# Patient Record
Sex: FEMALE | Race: WHITE | Employment: FULL TIME | URBAN - METROPOLITAN AREA
[De-identification: names, ages, dates, MRNs, and addresses within clinical notes are randomized per-mention and may not be internally consistent; named-entity substitution may affect disease eponyms.]

---

## 2017-05-08 ENCOUNTER — ALLSCRIPTS OFFICE VISIT (OUTPATIENT)
Dept: OTHER | Facility: OTHER | Age: 52
End: 2017-05-08

## 2017-05-08 DIAGNOSIS — R31.29 OTHER MICROSCOPIC HEMATURIA: ICD-10-CM

## 2017-05-08 LAB
BILIRUB UR QL STRIP: NEGATIVE
CLARITY UR: NORMAL
COLOR UR: YELLOW
GLUCOSE (HISTORICAL): NEGATIVE
HGB UR QL STRIP.AUTO: NORMAL
KETONES UR STRIP-MCNC: NEGATIVE MG/DL
LEUKOCYTE ESTERASE UR QL STRIP: NORMAL
NITRITE UR QL STRIP: NEGATIVE
PH UR STRIP.AUTO: 7.5 [PH]
PROT UR STRIP-MCNC: NEGATIVE MG/DL
SP GR UR STRIP.AUTO: 1
UROBILINOGEN UR QL STRIP.AUTO: 0.2

## 2017-05-09 ENCOUNTER — GENERIC CONVERSION - ENCOUNTER (OUTPATIENT)
Dept: OTHER | Facility: OTHER | Age: 52
End: 2017-05-09

## 2017-05-09 ENCOUNTER — LAB CONVERSION - ENCOUNTER (OUTPATIENT)
Dept: OTHER | Facility: OTHER | Age: 52
End: 2017-05-09

## 2017-05-09 LAB — COLOR UR: NORMAL

## 2017-05-10 ENCOUNTER — GENERIC CONVERSION - ENCOUNTER (OUTPATIENT)
Dept: OTHER | Facility: OTHER | Age: 52
End: 2017-05-10

## 2017-05-10 ENCOUNTER — LAB CONVERSION - ENCOUNTER (OUTPATIENT)
Dept: OTHER | Facility: OTHER | Age: 52
End: 2017-05-10

## 2017-06-14 ENCOUNTER — LAB CONVERSION - ENCOUNTER (OUTPATIENT)
Dept: OTHER | Facility: OTHER | Age: 52
End: 2017-06-14

## 2017-06-14 LAB
25(OH)D3 SERPL-MCNC: 27 NG/ML (ref 30–100)
A/G RATIO (HISTORICAL): 2 (CALC) (ref 1–2.5)
ALBUMIN SERPL BCP-MCNC: 4.3 G/DL (ref 3.6–5.1)
ALP SERPL-CCNC: 95 U/L (ref 33–130)
ALT SERPL W P-5'-P-CCNC: 14 U/L (ref 6–29)
AST SERPL W P-5'-P-CCNC: 18 U/L (ref 10–35)
BASOPHILS # BLD AUTO: 0.5 %
BASOPHILS # BLD AUTO: 35 CELLS/UL (ref 0–200)
BILIRUB SERPL-MCNC: 0.6 MG/DL (ref 0.2–1.2)
BUN SERPL-MCNC: 17 MG/DL (ref 7–25)
BUN/CREA RATIO (HISTORICAL): NORMAL (CALC) (ref 6–22)
CALCIUM SERPL-MCNC: 9.4 MG/DL (ref 8.6–10.4)
CHLORIDE SERPL-SCNC: 107 MMOL/L (ref 98–110)
CHOLEST SERPL-MCNC: 195 MG/DL (ref 125–200)
CHOLEST/HDLC SERPL: 3.6 (CALC)
CO2 SERPL-SCNC: 31 MMOL/L (ref 20–31)
CREAT SERPL-MCNC: 0.65 MG/DL (ref 0.5–1.05)
DEPRECATED RDW RBC AUTO: 13.5 % (ref 11–15)
EGFR AFRICAN AMERICAN (HISTORICAL): 119 ML/MIN/1.73M2
EGFR-AMERICAN CALC (HISTORICAL): 103 ML/MIN/1.73M2
EOSINOPHIL # BLD AUTO: 161 CELLS/UL (ref 15–500)
EOSINOPHIL # BLD AUTO: 2.3 %
GAMMA GLOBULIN (HISTORICAL): 2.2 G/DL (CALC) (ref 1.9–3.7)
GLUCOSE (HISTORICAL): 94 MG/DL (ref 65–99)
HCT VFR BLD AUTO: 45.8 % (ref 35–45)
HDLC SERPL-MCNC: 54 MG/DL
HGB BLD-MCNC: 15.2 G/DL (ref 11.7–15.5)
LDL CHOLESTEROL (HISTORICAL): 123 MG/DL (CALC)
LYME IGG/IGM AB (HISTORICAL): <0.9 INDEX
LYMPHOCYTES # BLD AUTO: 1715 CELLS/UL (ref 850–3900)
LYMPHOCYTES # BLD AUTO: 24.5 %
MCH RBC QN AUTO: 30.7 PG (ref 27–33)
MCHC RBC AUTO-ENTMCNC: 33.2 G/DL (ref 32–36)
MCV RBC AUTO: 92.5 FL (ref 80–100)
MONOCYTES # BLD AUTO: 490 CELLS/UL (ref 200–950)
MONOCYTES (HISTORICAL): 7 %
NEUTROPHILS # BLD AUTO: 4599 CELLS/UL (ref 1500–7800)
NEUTROPHILS # BLD AUTO: 65.7 %
NON-HDL-CHOL (CHOL-HDL) (HISTORICAL): 141 MG/DL (CALC)
PLATELET # BLD AUTO: 225 THOUSAND/UL (ref 140–400)
PMV BLD AUTO: 7.9 FL (ref 7.5–12.5)
POTASSIUM SERPL-SCNC: 4.3 MMOL/L (ref 3.5–5.3)
RBC # BLD AUTO: 4.96 MILLION/UL (ref 3.8–5.1)
SODIUM SERPL-SCNC: 143 MMOL/L (ref 135–146)
TOTAL PROTEIN (HISTORICAL): 6.5 G/DL (ref 6.1–8.1)
TRIGL SERPL-MCNC: 89 MG/DL
TSH SERPL DL<=0.05 MIU/L-ACNC: 2.19 MIU/L
WBC # BLD AUTO: 7 THOUSAND/UL (ref 3.8–10.8)

## 2017-06-15 ENCOUNTER — GENERIC CONVERSION - ENCOUNTER (OUTPATIENT)
Dept: OTHER | Facility: OTHER | Age: 52
End: 2017-06-15

## 2017-08-09 ENCOUNTER — ALLSCRIPTS OFFICE VISIT (OUTPATIENT)
Dept: OTHER | Facility: OTHER | Age: 52
End: 2017-08-09

## 2017-08-09 LAB
BILIRUB UR QL STRIP: NEGATIVE
CLARITY UR: NORMAL
COLOR UR: NORMAL
GLUCOSE (HISTORICAL): NEGATIVE
HGB UR QL STRIP.AUTO: NORMAL
KETONES UR STRIP-MCNC: NEGATIVE MG/DL
LEUKOCYTE ESTERASE UR QL STRIP: NORMAL
NITRITE UR QL STRIP: NEGATIVE
PH UR STRIP.AUTO: 5 [PH]
PROT UR STRIP-MCNC: NORMAL MG/DL
SP GR UR STRIP.AUTO: 1.01
UROBILINOGEN UR QL STRIP.AUTO: 0.2

## 2017-08-11 ENCOUNTER — LAB CONVERSION - ENCOUNTER (OUTPATIENT)
Dept: OTHER | Facility: OTHER | Age: 52
End: 2017-08-11

## 2017-08-11 ENCOUNTER — GENERIC CONVERSION - ENCOUNTER (OUTPATIENT)
Dept: OTHER | Facility: OTHER | Age: 52
End: 2017-08-11

## 2017-08-11 LAB
BACTERIA UR QL AUTO: ABNORMAL /HPF
BILIRUB UR QL STRIP: NEGATIVE
COLOR UR: ABNORMAL
COMMENT (HISTORICAL): ABNORMAL
FECAL OCCULT BLOOD DIAGNOSTIC (HISTORICAL): ABNORMAL
GLUCOSE (HISTORICAL): NEGATIVE
HYALINE CASTS #/AREA URNS LPF: ABNORMAL /LPF
KETONES UR STRIP-MCNC: NEGATIVE MG/DL
LEUKOCYTE ESTERASE UR QL STRIP: ABNORMAL
NITRITE UR QL STRIP: NEGATIVE
PH UR STRIP.AUTO: 5.5 [PH] (ref 5–8)
PROT UR STRIP-MCNC: ABNORMAL MG/DL
RBC (HISTORICAL): ABNORMAL /HPF
RENAL EPITHELIAL CELLS (HISTORICAL): ABNORMAL /HPF
SP GR UR STRIP.AUTO: 1.02 (ref 1–1.03)
SQUAMOUS EPITHELIAL CELLS (HISTORICAL): ABNORMAL /HPF
WBC # BLD AUTO: ABNORMAL /HPF

## 2017-08-21 ENCOUNTER — GENERIC CONVERSION - ENCOUNTER (OUTPATIENT)
Dept: OTHER | Facility: OTHER | Age: 52
End: 2017-08-21

## 2017-08-21 ENCOUNTER — ALLSCRIPTS OFFICE VISIT (OUTPATIENT)
Dept: OTHER | Facility: OTHER | Age: 52
End: 2017-08-21

## 2017-08-21 LAB
BILIRUB UR QL STRIP: NEGATIVE
CLARITY UR: NORMAL
COLOR UR: YELLOW
GLUCOSE (HISTORICAL): NEGATIVE
HGB UR QL STRIP.AUTO: NEGATIVE
KETONES UR STRIP-MCNC: NEGATIVE MG/DL
LEUKOCYTE ESTERASE UR QL STRIP: NEGATIVE
NITRITE UR QL STRIP: NEGATIVE
PH UR STRIP.AUTO: 6 [PH]
PROT UR STRIP-MCNC: NEGATIVE MG/DL
SP GR UR STRIP.AUTO: 1.01
UROBILINOGEN UR QL STRIP.AUTO: 0.2

## 2017-10-17 ENCOUNTER — ALLSCRIPTS OFFICE VISIT (OUTPATIENT)
Dept: OTHER | Facility: OTHER | Age: 52
End: 2017-10-17

## 2017-10-17 DIAGNOSIS — R39.9 UNSPECIFIED SYMPTOMS AND SIGNS INVOLVING THE GENITOURINARY SYSTEM: ICD-10-CM

## 2017-10-17 LAB
BILIRUB UR QL STRIP: NEGATIVE
CLARITY UR: NORMAL
COLOR UR: YELLOW
GLUCOSE (HISTORICAL): NEGATIVE
HGB UR QL STRIP.AUTO: NORMAL
KETONES UR STRIP-MCNC: NEGATIVE MG/DL
LEUKOCYTE ESTERASE UR QL STRIP: NORMAL
NITRITE UR QL STRIP: NEGATIVE
PH UR STRIP.AUTO: 9 [PH]
PROT UR STRIP-MCNC: NEGATIVE MG/DL
SP GR UR STRIP.AUTO: 1
UROBILINOGEN UR QL STRIP.AUTO: 0.2

## 2017-10-20 ENCOUNTER — LAB CONVERSION - ENCOUNTER (OUTPATIENT)
Dept: OTHER | Facility: OTHER | Age: 52
End: 2017-10-20

## 2017-10-20 ENCOUNTER — GENERIC CONVERSION - ENCOUNTER (OUTPATIENT)
Dept: OTHER | Facility: OTHER | Age: 52
End: 2017-10-20

## 2017-10-20 LAB
BACTERIA UR QL AUTO: ABNORMAL /HPF
BILIRUB UR QL STRIP: NEGATIVE
COLOR UR: YELLOW
COMMENT (HISTORICAL): CLEAR
FECAL OCCULT BLOOD DIAGNOSTIC (HISTORICAL): ABNORMAL
GLUCOSE (HISTORICAL): NEGATIVE
HYALINE CASTS #/AREA URNS LPF: ABNORMAL /LPF
KETONES UR STRIP-MCNC: NEGATIVE MG/DL
LEUKOCYTE ESTERASE UR QL STRIP: ABNORMAL
NITRITE UR QL STRIP: NEGATIVE
PH UR STRIP.AUTO: 8 [PH] (ref 5–8)
PROT UR STRIP-MCNC: NEGATIVE MG/DL
RBC (HISTORICAL): ABNORMAL /HPF
SP GR UR STRIP.AUTO: 1.01 (ref 1–1.03)
SQUAMOUS EPITHELIAL CELLS (HISTORICAL): ABNORMAL /HPF
WBC # BLD AUTO: ABNORMAL /HPF

## 2017-10-21 NOTE — PROGRESS NOTES
Assessment  1  UTI symptoms (788 99) (R39 9)   2  Microscopic hematuria (599 72) (R31 29)   3  Leukocytes in urine (791 7) (R82 99)   4  Hypovitaminosis D (268 9) (E55 9)    Plan   UTI symptoms    · Nitrofurantoin Monohyd Macro 100 MG Oral Capsule; TAKE 1 CAPSULE TWICE  DAILY UNTIL GONE   · (1) HEMOGLOBIN A1C; Status:Active; Requested for:17Oct2017;    · (1) URINE CULTURE; Source:Urine, Clean Catch; Status:Active; Requested  for:17Oct2017;    · Urine Dip Non-Automated- POC; Status:Complete;   Done: 00OTV6311 12:43PM    (1) URINALYSIS (will reflex a microscopy if leukocytes, occult blood, protein or nitrites are not within normal limits); Status:Resulted - Requires Verification;   Done: 49XDO0749 12:00AM  Due:17Oct2018; Ordered; For:UTI symptoms; Ordered By:Shae Beckham; Discussion/Summary    29-year-old female presents with UTI symptoms with POC urine dip positive for leukocytes and microscopic blood  Will start empiric treatment with nitrofurantoin  Will send urine for urinalysis and reflex culture  Will call patient with results  Have encouraged patient to increase water intake, may benefit from starting a daily cranberry capsule  Hypovitaminosis D, patient takes 4000 International Units daily  notify patient of urine culture results  parameters discussed  The patient was counseled regarding diagnostic results,-- instructions for management,-- risk factor reductions,-- prognosis,-- patient and family education,-- impressions,-- risks and benefits of treatment options,-- importance of compliance with treatment  Possible side effects of new medications were reviewed with the patient/guardian today  The treatment plan was reviewed with the patient/guardian  The patient/guardian understands and agrees with the treatment plan      Chief Complaint  Patient is here c/o pain with urination and noticeable blood in urine x's 4+ days  All medications were reviewed and updated with the patient        History of Present Illness  HPI: 49-year-old female presents with a 4-5 day h/o urinary frequency, urgency, burning on voiding,has noticed blood fever, chills at presentvit d, helping with energy levela total of 4000 iu daily      Review of Systems    Constitutional: no fever-- and-- no chills  Gastrointestinal: no abdominal pain-- and-- no constipation  Genitourinary: dysuria  Active Problems  1  Current tobacco use (305 1) (Z72 0)   2  Decreased hearing (389 9) (H91 90)   3  Encounter for screening mammogram for malignant neoplasm of breast (V76 12)   (Z12 31)   4  Fatigue (780 79) (R53 83)   5  Hypovitaminosis D (268 9) (E55 9)   6  Leukocytes in urine (791 7) (R82 99)   7  Microscopic hematuria (599 72) (R31 29)   8  Risk of exposure to Lyme disease (V15 89) (Z91 89)   9  Screening for colorectal cancer (V76 51) (Z12 11,Z12 12)   10  Screening for lipoid disorders (V77 91) (Z13 220)   11  Screening for other and unspecified cardiovascular conditions (V81 2) (Z13 6)   12  Tobacco abuse counseling (V65 42,305 1) (Z71 6)   13  UTI symptoms (788 99) (R39 9)   14  Vaginal Pap smear (V76 47) (Z12 72)   15  Visit for screening mammogram (V76 12) (Z12 31)    Past Medical History  1  History of hearing loss (V12 49) (Z86 69)   2  History of Screening for lipoid disorders (V77 91) (Z13 220)    Family History  Mother    1  Family history of diabetes mellitus (V18 0) (Z83 3)   2  Family history of diabetes mellitus (V18 0) (Z83 3)  Father    3  Family history of    4  Family history of cerebral infarction (V17 1) (Z82 3)  Maternal Aunt    5  Family history of malignant neoplasm of breast (V16 3) (Z80 3)    Social History   · Current every day smoker (305 1) (F17 200)   · 1 ppd       smoked for 15 yrs, quit for 15 yrs and now has smoked for 3 yrs  · Current tobacco use (305 1) (Z72 0)    Surgical History  1  History of Tonsillectomy With Adenoidectomy    Current Meds   1  Multivitamins Oral Capsule;    Therapy: 64DVE6658 to Recorded   2  Nitrofurantoin Monohyd Macro 100 MG Oral Capsule; TAKE 1 CAPSULE TWICE DAILY   UNTIL GONE;   Therapy: 28ZAH2993 to (Evaluate:96Ebp4691)  Requested for: 96Mvk1263; Last   Rx:05Urq4553 Ordered    The medication list was reviewed and updated today  Allergies  1  No Known Drug Allergies    Vitals   Recorded: 91LAE4568 12:41PM   Temperature 95 F   Heart Rate 64   Respiration 16   Systolic 933   Diastolic 62   Height 5 ft 9 5 in   Weight 207 lb 8 oz   BMI Calculated 30 2   BSA Calculated 2 11     Physical Exam    Constitutional   General appearance: No acute distress, well appearing and well nourished  Ears, Nose, Mouth, and Throat   Oropharynx: Normal with no erythema, edema, exudate or lesions  Pulmonary   Respiratory effort: No increased work of breathing or signs of respiratory distress  Auscultation of lungs: Clear to auscultation  Cardiovascular   Auscultation of heart: Normal rate and rhythm, normal S1 and S2, without murmurs  Abdomen   Abdomen: Non-tender, no masses  -- no cva tenderness noted  Liver and spleen: No hepatomegaly or splenomegaly           Results/Data  Urine Dip Non-Automated- POC [id number] 12:43PM Yvonne Russell     Test Name Result Flag Reference   Color Yellow     Clarity Hazy     Leukocytes 500+++     Nitrite negative     Blood +++     Bilirubin negative     Urobilinogen 0 2     Protein negative     Ph 9 0     Specific Gravity 1 000     Ketone negative     Glucose negative         Signatures   Electronically signed by : Eva Simmons MD; Oct 20 2017 12:31PM EST                       (Author)

## 2017-11-03 ENCOUNTER — ALLSCRIPTS OFFICE VISIT (OUTPATIENT)
Dept: OTHER | Facility: OTHER | Age: 52
End: 2017-11-03

## 2017-11-03 ENCOUNTER — GENERIC CONVERSION - ENCOUNTER (OUTPATIENT)
Dept: OTHER | Facility: OTHER | Age: 52
End: 2017-11-03

## 2017-11-03 LAB
BILIRUB UR QL STRIP: NEGATIVE
CLARITY UR: NORMAL
COLOR UR: YELLOW
GLUCOSE (HISTORICAL): NEGATIVE
HGB UR QL STRIP.AUTO: NEGATIVE
KETONES UR STRIP-MCNC: NEGATIVE MG/DL
LEUKOCYTE ESTERASE UR QL STRIP: NORMAL
NITRITE UR QL STRIP: NEGATIVE
PH UR STRIP.AUTO: 5 [PH]
PROT UR STRIP-MCNC: NORMAL MG/DL
SP GR UR STRIP.AUTO: 1.01
UROBILINOGEN UR QL STRIP.AUTO: 0.2

## 2017-11-06 ENCOUNTER — GENERIC CONVERSION - ENCOUNTER (OUTPATIENT)
Dept: OTHER | Facility: OTHER | Age: 52
End: 2017-11-06

## 2017-11-06 ENCOUNTER — LAB CONVERSION - ENCOUNTER (OUTPATIENT)
Dept: OTHER | Facility: OTHER | Age: 52
End: 2017-11-06

## 2017-11-06 LAB — COLOR UR: NORMAL

## 2018-01-10 NOTE — RESULT NOTES
Verified Results  Urine Dip Non-Automated- POC 79IEC6743 12:43PM Shae Beckham     Test Name Result Flag Reference   Color Yellow     Clarity Hazy     Leukocytes 500+++     Nitrite negative     Blood +++     Bilirubin negative     Urobilinogen 0 2     Protein negative     Ph 9 0     Specific Gravity 1 000     Ketone negative     Glucose negative         Plan  UTI symptoms    · Nitrofurantoin Monohyd Macro 100 MG Oral Capsule; TAKE 1 CAPSULE TWICE  DAILY UNTIL GONE   · (1) HEMOGLOBIN A1C; Status:Active; Requested for:17Oct2017;    · (1) URINALYSIS (will reflex a microscopy if leukocytes, occult blood, protein or nitrites  are not within normal limits); Status:Active; Requested for:17Oct2017;    · (1) URINE CULTURE; Source:Urine, Clean Catch; Status:Active;  Requested  for:17Oct2017;    · Urine Dip Non-Automated- POC; Status:Complete;   Done: 75BHW2308 12:43PM

## 2018-01-10 NOTE — RESULT NOTES
Verified Results  Urine Dip Non-Automated- POC 27Poq7605 02:57PM Shae Taylor     Test Name Result Flag Reference   Color Yellow     Clarity Transparent     Leukocytes negative     Nitrite negative     Blood negative     Bilirubin negative     Urobilinogen 0 2     Protein negative     Ph 6 0     Specific Gravity 1 010     Ketone negative     Glucose negative     Color Yellow     Clarity Transparent     Leukocytes negative     Nitrite negative     Blood negative     Bilirubin negative     Urobilinogen 0 2     Protein negative     Ph 6 0     Specific Gravity 1 010     Ketone negative     Glucose negative

## 2018-01-10 NOTE — RESULT NOTES
Verified Results  (Q) URINALYSIS REFLEX [id number] 12:00AM Shae Beckham     Test Name Result Flag Reference   COLOR DARK YELLOW  YELLOW   APPEARANCE CLOUDY A CLEAR   SPECIFIC GRAVITY 1 018  1 001-1 035   PH 5 5  5 0-8 0   GLUCOSE NEGATIVE  NEGATIVE   BILIRUBIN NEGATIVE  NEGATIVE   KETONES NEGATIVE  NEGATIVE   OCCULT BLOOD 3+ A NEGATIVE   PROTEIN 1+ A NEGATIVE   NITRITE NEGATIVE  NEGATIVE   LEUKOCYTE ESTERASE 3+ A NEGATIVE   WBC 40-60 /HPF A < OR = 5   RBC 20-40 /HPF A < OR = 2   SQUAMOUS EPITHELIAL CELLS 0-5 /HPF  < OR = 5   RENAL EPITHELIAL CELLS 4-5 /HPF A < OR = 3   BACTERIA NONE SEEN /HPF  NONE SEEN   HYALINE CAST NONE SEEN /LPF  NONE SEEN

## 2018-01-11 NOTE — RESULT NOTES
Verified Results  (1) URINE CULTURE [id number] 12:00AM Shae Beckham     Test Name Result Flag Reference   CULTURE, URINE, ROUTINE  A    CULTURE, URINE, ROUTINE         MICRO NUMBER:      [id number]    TEST STATUS:       FINAL    SPECIMEN SOURCE:   URINE    SPECIMEN QUALITY:  ADEQUATE    RESULT:            10,000-50,000 CFU/mL of Escherichia coli                                               We received a preserved urine culture transport                       tube and performed a urine culture  If this is                        not what you intended to order, please contact                       your local                        immediately so that we can adjust our billing                       appropriately  You may also inquire about                       alternative or additional testing                              E coli                            ----------------                            INT   PAULINE     AMOX/CLAVULANATE       S     8 0     AMPICILLIN             R     >=32 0     AMP/SULBACTAM          R     >=32 0     CEFAZOLIN              NR    <=4 0 **1     CEFEPIME               S     <=1 0     CEFTRIAXONE            S     <=1 0     CIPROFLOXACIN          S     <=0 25     ERTAPENEM              S     <=0 5     GENTAMICIN             S     <=1 0     IMIPENEM               S     <=0 25     LEVOFLOXACIN           S     <=0 12     NITROFURANTOIN         S     <=16 0     PIP/TAZOBACTAM         S     <=4 0     TOBRAMYCIN             S     <=1 0     TRIMETHOPRIM/SULFA     S     <=20 0  S=Susceptible  I=Intermediate  R=Resistant  * = Not Tested  NR = Not Reported  **NN = See Therapy Comments  THERAPY COMMENTS      Note 1:      ORAL therapy: A cefazolin PAULINE of < 32 predicts      susceptibility to the oral agents cefaclor,      cefdinir, cefpodoxime, cefprozil, cefuroxime,      cephalexin, and loracarbef when used for therapy      of uncomplicated UTIs due to E  coli,      K  pneumoniae, and P  mirabilis  PARENTERAL therapy: A cefazolin PAULINE of > 8      indicates resistance to parenteral cefazolin  An alternate test method must be performed to      to confirm susceptibility to parenteral cefazolin

## 2018-01-12 NOTE — PROGRESS NOTES
Chief Complaint  Patient is here for a urine dip recheck  Active Problems   1  Current tobacco use (305 1) (Z72 0)  2  Decreased hearing (389 9) (H91 90)  3  Encounter for screening mammogram for malignant neoplasm of breast (V76 12)   (Z12 31)  4  Fatigue (780 79) (R53 83)  5  Hypovitaminosis D (268 9) (E55 9)  6  Leukocytes in urine (791 7) (R82 99)  7  Microscopic hematuria (599 72) (R31 29)  8  Risk of exposure to Lyme disease (V15 89) (Z91 89)  9  Screening for colorectal cancer (V76 51) (Z12 11,Z12 12)  10  Screening for lipoid disorders (V77 91) (Z13 220)  11  Screening for other and unspecified cardiovascular conditions (V81 2) (Z13 6)  12  Tobacco abuse counseling (V65 42,305 1) (Z71 6)  13  UTI symptoms (788 99) (R39 9)  14  Vaginal Pap smear (V76 47) (Z12 72)  15  Visit for screening mammogram (V76 12) (Z12 31)    Current Meds  1  Nitrofurantoin Monohyd Macro 100 MG Oral Capsule; TAKE 1 CAPSULE TWICE DAILY   UNTIL GONE;   Therapy: 23NTV5425 to (Evaluate:98Kmb3511)  Requested for: 21Wmq2936; Last   Rx:[id number] Ordered    Allergies   1  No Known Drug Allergies    Plan   Urine Dip Non-Automated- POC; Status:Resulted - Requires Verification,Retrospective By Protocol Authorization;   Done: 00VTG0663 12:00AM  Due:24Yvf7477; Last Updated By:Klaudia Riggins; 8/21/2017 3:02:37 PM;Ordered; For:UTI symptoms; Ordered By:Shae Beckham;       Signatures   Electronically signed by : Dexter Leigh MD; Aug 21 2017  3:03PM EST                       (Author)

## 2018-01-13 VITALS
SYSTOLIC BLOOD PRESSURE: 108 MMHG | HEART RATE: 76 BPM | BODY MASS INDEX: 28.79 KG/M2 | RESPIRATION RATE: 16 BRPM | TEMPERATURE: 95.9 F | HEIGHT: 70 IN | WEIGHT: 201.13 LBS | DIASTOLIC BLOOD PRESSURE: 68 MMHG

## 2018-01-14 VITALS
WEIGHT: 207.5 LBS | SYSTOLIC BLOOD PRESSURE: 102 MMHG | TEMPERATURE: 95 F | HEART RATE: 64 BPM | DIASTOLIC BLOOD PRESSURE: 62 MMHG | RESPIRATION RATE: 16 BRPM | HEIGHT: 70 IN | BODY MASS INDEX: 29.71 KG/M2

## 2018-01-14 VITALS
BODY MASS INDEX: 28.22 KG/M2 | RESPIRATION RATE: 16 BRPM | HEIGHT: 70 IN | DIASTOLIC BLOOD PRESSURE: 76 MMHG | HEART RATE: 72 BPM | SYSTOLIC BLOOD PRESSURE: 118 MMHG | WEIGHT: 197.13 LBS | TEMPERATURE: 96.3 F

## 2018-01-14 NOTE — RESULT NOTES
Verified Results  (1) URINALYSIS (will reflex a microscopy if leukocytes, occult blood, protein or nitrites are not within normal limits) 17Oct2017 12:00AM Shae Beckham     Test Name Result Flag Reference   SPECIFIC GRAVITY 1 009  1 001-1 035   BILIRUBIN NEGATIVE  NEGATIVE   GLUCOSE NEGATIVE  NEGATIVE   COLOR YELLOW  YELLOW   APPEARANCE CLEAR  CLEAR   PH 8 0  5 0-8 0   KETONES NEGATIVE  NEGATIVE   OCCULT BLOOD 2+ A NEGATIVE   PROTEIN NEGATIVE  NEGATIVE   NITRITE NEGATIVE  NEGATIVE   LEUKOCYTE ESTERASE 3+ A NEGATIVE   SQUAMOUS EPITHELIAL CELLS 10-20 /HPF A < OR = 5   BACTERIA NONE SEEN /HPF  NONE SEEN   HYALINE CAST NONE SEEN /LPF  NONE SEEN   WBC 6-10 /HPF A < OR = 5   RBC 3-10 /HPF A < OR = 2     (1) URINE CULTURE 17Oct2017 12:00AM Shae Beckham     Test Name Result Flag Reference   CULTURE, URINE, ROUTINE      CULTURE, URINE, ROUTINE         MICRO NUMBER:      [id number]    TEST STATUS:       FINAL    SPECIMEN SOURCE:   URINE    SPECIMEN QUALITY:  ADEQUATE    RESULT:            Multiple organisms present, each less than 10,000                       CFU/mL  These organisms, commonly found on                       external and internal genitalia, are considered                       to be colonizers  No further testing performed

## 2018-01-14 NOTE — RESULT NOTES
Verified Results  (Q) LIPID PANEL WITH REFLEX TO DIRECT LDL [id number] 07:45AM Shae Beckham     Test Name Result Flag Reference   CHOLESTEROL, TOTAL 195 mg/dL  125-200   HDL CHOLESTEROL 54 mg/dL  > OR = 46   TRIGLICERIDES 89 mg/dL  <600   LDL-CHOLESTEROL 123 mg/dL (calc)  <130   Desirable range <100 mg/dL for patients with CHD or  diabetes and <70 mg/dL for diabetic patients with  known heart disease  CHOL/HDLC RATIO 3 6 (calc)  < OR = 5 0   NON HDL CHOLESTEROL 141 mg/dL (calc)     Target for non-HDL cholesterol is 30 mg/dL higher than   LDL cholesterol target  (1) COMPREHENSIVE METABOLIC PANEL 11ORC7107 13:42RB Shae Beckham     Test Name Result Flag Reference   GLUCOSE 94 mg/dL  65-99   Fasting reference interval   UREA NITROGEN (BUN) 17 mg/dL  7-25   CREATININE 0 65 mg/dL  0 50-1 05   For patients >52years of age, the reference limit  for Creatinine is approximately 13% higher for people  identified as -American  eGFR NON-AFR   AMERICAN 103 mL/min/1 73m2  > OR = 60   eGFR AFRICAN AMERICAN 119 mL/min/1 73m2  > OR = 60   BUN/CREATININE RATIO   5-67   NOT APPLICABLE (calc)   SODIUM 143 mmol/L  135-146   POTASSIUM 4 3 mmol/L  3 5-5 3   CHLORIDE 107 mmol/L     CARBON DIOXIDE 31 mmol/L  20-31   CALCIUM 9 4 mg/dL  8 6-10 4   PROTEIN, TOTAL 6 5 g/dL  6 1-8 1   ALBUMIN 4 3 g/dL  3 6-5 1   GLOBULIN 2 2 g/dL (calc)  1 9-3 7   ALBUMIN/GLOBULIN RATIO 2 0 (calc)  1 0-2 5   BILIRUBIN, TOTAL 0 6 mg/dL  0 2-1 2   ALKALINE PHOSPHATASE 95 U/L     AST 18 U/L  10-35   ALT 14 U/L  6-29     (1) CBC/PLT/DIFF [id number] 07:45AM Shae Beckham     Test Name Result Flag Reference   WHITE BLOOD CELL COUNT 7 0 Thousand/uL  3 8-10 8   RED BLOOD CELL COUNT 4 96 Million/uL  3 80-5 10   HEMOGLOBIN 15 2 g/dL  11 7-15 5   HEMATOCRIT 45 8 % H 35 0-45 0   MCV 92 5 fL  80 0-100 0   MCH 30 7 pg  27 0-33 0   MCHC 33 2 g/dL  32 0-36 0   RDW 13 5 %  11 0-15 0   PLATELET COUNT 562 Thousand/uL  140-400   ABSOLUTE NEUTROPHILS 4599 cells/uL  0509-5176   ABSOLUTE LYMPHOCYTES 1715 cells/uL  850-3900   ABSOLUTE MONOCYTES 490 cells/uL  200-950   ABSOLUTE EOSINOPHILS 161 cells/uL     ABSOLUTE BASOPHILS 35 cells/uL  0-200   NEUTROPHILS 65 7 %     LYMPHOCYTES 24 5 %     MONOCYTES 7 0 %     EOSINOPHILS 2 3 %     BASOPHILS 0 5 %     MPV 7 9 fL  7 5-12 5     (Q) LYME DISEASE AB, TOTAL W/REFL WB (IGG, IGM) [id number] 07:45AM Shae Beckham     Test Name Result Flag Reference   LYME AB SCREEN <0 90 index     Index                Interpretation                     -----                --------------                     < 0 90               Negative                     0  90-1 09            Equivocal                     > 1 09               Positive      As recommended by the Food and Drug Administration   (FDA), all samples with positive or equivocal   results in a Borrelia burgdorferi antibody screen  will be tested using a blot method  Positive or   equivocal screening test results should not be   interpreted as truly positive until verified as such   using a supplemental assay (e g , B  burgdorferi blot)  The screening test and/or blot for B  burgdorferi   antibodies may be falsely negative in early stages  of Lyme disease, including the period when erythema   migrans is apparent  *(Q) VITAMIN D, 25-HYDROXY, LC/MS/MS [id number] 07:45AM Shae Beckham     Test Name Result Flag Reference   VITAMIN D, 25-OH, TOTAL 27 ng/mL L    Vitamin D Status         25-OH Vitamin D:     Deficiency:                    <20 ng/mL  Insufficiency:             20 - 29 ng/mL  Optimal:                 > or = 30 ng/mL     For 25-OH Vitamin D testing on patients on   D2-supplementation and patients for whom quantitation   of D2 and D3 fractions is required, the Dynamic Recreation(TM)  25-OH VIT D, (D2,D3), LC/MS/MS is recommended: order   code 65376 (patients >2yrs)       For more information on this test, go to:  [email or web address]  (This link is being provided for   informational/educational purposes only )     (Q) TSH, 3RD GENERATION W/REFLEX TO FT4 [id number] 07:45AM Shae Beckham   REPORT COMMENT:  FASTING:YES     Test Name Result Flag Reference   TSH W/REFLEX TO FT4 2 19 mIU/L     Reference Range                         > or = 20 Years  0 40-4 50                              Pregnancy Ranges            First trimester    0 26-2 66            Second trimester   0 55-2 73            Third trimester    0 43-2 91

## 2018-01-15 NOTE — RESULT NOTES
Verified Results  Urine Dip Non-Automated- POC [id number] 03:07PM Shae Beckham     Test Name Result Flag Reference   Color Yellow     Clarity Transparent     Leukocytes 15+-     Nitrite negative     Blood negative     Bilirubin negative     Urobilinogen 0 2     Protein 15+-     Ph 5 0     Specific Gravity 1 015     Ketone negative     Glucose negative     Color Yellow     Clarity Transparent     Leukocytes 15+-     Nitrite negative     Blood negative     Bilirubin negative     Urobilinogen 0 2     Protein 15+-     Ph 5 0     Specific Gravity 1 015     Ketone negative     Glucose negative

## 2018-01-15 NOTE — RESULT NOTES
Verified Results  (1) URINALYSIS w URINE C/S REFLEX (will reflex a microscopy if leukocytes, occult blood, or nitrites are not within normal limits) [id number] 12:00AM Shae Beckham     Test Name Result Flag Reference   COLOR TNP     **********************************   * Test not performed  *   * No suitable specimen received  *   **********************************     (1) URINE CULTURE [id number] 12:00AM Shae Beckham     Test Name Result Flag Reference   CULTURE, URINE, ROUTINE (Report)     CULTURE, URINE, ROUTINE       MICRO NUMBER:   [id number]   TEST STATUS:    FINAL   SPECIMEN SOURCE:  URINE   SPECIMEN QUALITY: ADEQUATE   RESULT:      No Growth                          We received a preserved urine culture transport             tube and performed a urine culture  If this is              not what you intended to order, please contact             your local              immediately so that we can adjust our billing             appropriately  You may also inquire about             alternative or additional testing

## 2018-01-16 NOTE — RESULT NOTES
Verified Results  (1) URINALYSIS w URINE C/S REFLEX (will reflex a microscopy if leukocytes, occult blood, or nitrites are not within normal limits) [id number] 12:00AM Shae Beckham     Test Name Result Flag Reference   COLOR TNP     **********************************   * Test not performed  *   * No suitable specimen received   *   **********************************

## 2018-01-16 NOTE — PROGRESS NOTES
Chief Complaint  Patient is here for a urine dip  Active Problems   1  Current tobacco use (305 1) (Z72 0)  2  Decreased hearing (389 9) (H91 90)  3  Encounter for screening mammogram for malignant neoplasm of breast (V76 12)   (Z12 31)  4  Fatigue (780 79) (R53 83)  5  Hypovitaminosis D (268 9) (E55 9)  6  Leukocytes in urine (791 7) (R82 99)  7  Microscopic hematuria (599 72) (R31 29)  8  Risk of exposure to Lyme disease (V15 89) (Z91 89)  9  Screening for colorectal cancer (V76 51) (Z12 11,Z12 12)  10  Screening for lipoid disorders (V77 91) (Z13 220)  11  Screening for other and unspecified cardiovascular conditions (V81 2) (Z13 6)  12  Tobacco abuse counseling (V65 42,305 1) (Z71 6)  13  UTI symptoms (788 99) (R39 9)  14  Vaginal Pap smear (V76 47) (Z12 72)  15  Visit for screening mammogram (V76 12) (Z12 31)    Current Meds  1  Multivitamins Oral Capsule; Therapy: [id number] to Recorded  2  Nitrofurantoin Monohyd Macro 100 MG Oral Capsule; TAKE 1 CAPSULE TWICE DAILY   UNTIL GONE;   Therapy: 53VLD6033 to (03 17 74 30 53)  Requested for: 02IVL1976; Last   Rx:[id number] Ordered    Allergies   1  No Known Drug Allergies    Plan   Urine Dip Non-Automated- POC; Status:Resulted - Requires Verification,Retrospective By Protocol Authorization;   Done: 49XFI2646 12:00AM  [place]:[id number]; Last Updated By:Nisha Riggins; 11/3/2017 3:12:27 PM;Ordered; For:UTI symptoms; Ordered By:Shae Beckham;       Signatures   Electronically signed by : Stevie Dickson MD; Nov  3 2017  3:13PM EST                       (Author)

## 2018-07-06 ENCOUNTER — OFFICE VISIT (OUTPATIENT)
Dept: FAMILY MEDICINE CLINIC | Facility: CLINIC | Age: 53
End: 2018-07-06
Payer: COMMERCIAL

## 2018-07-06 VITALS
SYSTOLIC BLOOD PRESSURE: 112 MMHG | DIASTOLIC BLOOD PRESSURE: 74 MMHG | WEIGHT: 199.4 LBS | HEART RATE: 76 BPM | RESPIRATION RATE: 14 BRPM | BODY MASS INDEX: 29.02 KG/M2 | TEMPERATURE: 95.7 F

## 2018-07-06 DIAGNOSIS — R53.83 FATIGUE, UNSPECIFIED TYPE: ICD-10-CM

## 2018-07-06 DIAGNOSIS — E55.9 HYPOVITAMINOSIS D: ICD-10-CM

## 2018-07-06 DIAGNOSIS — Z12.11 COLON CANCER SCREENING: ICD-10-CM

## 2018-07-06 DIAGNOSIS — R22.9 LUMP OF SKIN: ICD-10-CM

## 2018-07-06 DIAGNOSIS — N93.9 ABNORMAL UTERINE BLEEDING: Primary | ICD-10-CM

## 2018-07-06 DIAGNOSIS — H91.90 HEARING LOSS, UNSPECIFIED HEARING LOSS TYPE, UNSPECIFIED LATERALITY: ICD-10-CM

## 2018-07-06 DIAGNOSIS — Z13.220 SCREENING FOR LIPID DISORDERS: ICD-10-CM

## 2018-07-06 DIAGNOSIS — Z00.00 ROUTINE HEALTH MAINTENANCE: ICD-10-CM

## 2018-07-06 DIAGNOSIS — Z72.0 TOBACCO USE: ICD-10-CM

## 2018-07-06 DIAGNOSIS — J06.9 UPPER RESPIRATORY TRACT INFECTION, UNSPECIFIED TYPE: ICD-10-CM

## 2018-07-06 LAB
BILIRUB UR QL STRIP: NEGATIVE
CLARITY UR: CLEAR
COLOR UR: NORMAL
GLUCOSE UR STRIP-MCNC: NEGATIVE MG/DL
HGB UR QL STRIP.AUTO: NEGATIVE
KETONES UR STRIP-MCNC: NEGATIVE MG/DL
LEUKOCYTE ESTERASE UR QL STRIP: NEGATIVE
NITRITE UR QL STRIP: NEGATIVE
PH UR STRIP.AUTO: 6 [PH] (ref 4.5–8)
PROT UR STRIP-MCNC: NEGATIVE MG/DL
SL AMB  POCT GLUCOSE, UA: ABNORMAL
SL AMB LEUKOCYTE ESTERASE,UA: ABNORMAL
SL AMB POCT BILIRUBIN,UA: ABNORMAL
SL AMB POCT BLOOD,UA: ABNORMAL
SL AMB POCT CLARITY,UA: CLEAR
SL AMB POCT COLOR,UA: ABNORMAL
SL AMB POCT KETONES,UA: ABNORMAL
SL AMB POCT NITRITE,UA: ABNORMAL
SL AMB POCT PH,UA: 6
SL AMB POCT SPECIFIC GRAVITY,UA: 1.02
SL AMB POCT URINE HCG: NEGATIVE
SL AMB POCT URINE PROTEIN: ABNORMAL
SL AMB POCT UROBILINOGEN: 0.2
SP GR UR STRIP.AUTO: 1.02 (ref 1–1.03)
UROBILINOGEN UR QL STRIP.AUTO: 1 E.U./DL

## 2018-07-06 PROCEDURE — 81003 URINALYSIS AUTO W/O SCOPE: CPT | Performed by: FAMILY MEDICINE

## 2018-07-06 PROCEDURE — 81025 URINE PREGNANCY TEST: CPT | Performed by: FAMILY MEDICINE

## 2018-07-06 PROCEDURE — 99214 OFFICE O/P EST MOD 30 MIN: CPT | Performed by: FAMILY MEDICINE

## 2018-07-06 PROCEDURE — 81002 URINALYSIS NONAUTO W/O SCOPE: CPT | Performed by: FAMILY MEDICINE

## 2018-07-06 RX ORDER — AZITHROMYCIN 250 MG/1
TABLET, FILM COATED ORAL
Qty: 6 TABLET | Refills: 0 | Status: SHIPPED | OUTPATIENT
Start: 2018-07-06 | End: 2018-07-10

## 2018-07-06 NOTE — ASSESSMENT & PLAN NOTE
Patient states she has had hearing loss since adolescence and would like a referral to ENT  This has been provided

## 2018-07-06 NOTE — ASSESSMENT & PLAN NOTE
Patient has a small subcutaneous lesion over her right lower cheek, unclear etiology  Will obtain ultrasound to start off with  She will also mention this to her dentist as well as ENT

## 2018-07-06 NOTE — ASSESSMENT & PLAN NOTE
Patient had irregular bleeding X 3 days ~ 2 weeks ago, 2 years after her last menstrual period  I would like to obtain pelvic ultrasound and refer to gyn  Patient is agreeable with this plan

## 2018-07-06 NOTE — ASSESSMENT & PLAN NOTE
Have strongly encouraged, advised and counseled on the importance of tobacco cessation  Patient states she will try

## 2018-07-06 NOTE — ASSESSMENT & PLAN NOTE
Patient presents with symptoms that appear consistent with upper respiratory infection  Will call in Rx for azithromycin  Recommend continue with symptomatic treatment and supportive measures including adequate hydration  I have strongly encouraged on tobacco cessation  Return parameters discussed

## 2018-07-06 NOTE — PROGRESS NOTES
FAMILY PRACTICE OFFICE VISIT       NAME: Otilia Dougherty  AGE: 46 y o  SEX: female       : 1965        MRN: [id number]    DATE: 2018  TIME: 5:00 PM    Assessment and Plan     Problem List Items Addressed This Visit     Abnormal uterine bleeding - Primary       Patient had irregular bleeding X 3 days ~ 2 weeks ago, 2 years after her last menstrual period  I would like to obtain pelvic ultrasound and refer to gyn  Patient is agreeable with this plan  Relevant Orders    US pelvis complete non OB    CBC and differential    Comprehensive metabolic panel    TSH, 3rd generation with Free T4 reflex    Ambulatory referral to Gynecology    POCT urine dip (Completed)    POCT urine HCG (Completed)    UA (URINE) with reflex to Microscopic    Tobacco use       Have strongly encouraged, advised and counseled on the importance of tobacco cessation  Patient states she will try  Upper respiratory tract infection       Patient presents with symptoms that appear consistent with upper respiratory infection  Will call in Rx for azithromycin  Recommend continue with symptomatic treatment and supportive measures including adequate hydration  I have strongly encouraged on tobacco cessation  Return parameters discussed  Relevant Medications    azithromycin (ZITHROMAX) 250 mg tablet    Lump of skin       Patient has a small subcutaneous lesion over her right lower cheek, unclear etiology  Will obtain ultrasound to start off with  She will also mention this to her dentist as well as ENT  Relevant Orders    US head neck soft tissue    Hypovitaminosis D    Relevant Orders    Vitamin D 25 hydroxy    Hearing loss       Patient states she has had hearing loss since adolescence and would like a referral to ENT  This has been provided  Relevant Orders    Ambulatory Referral to Otolaryngology    Routine health maintenance      Referral for screening colonoscopy has been provided  Other Visit Diagnoses     Fatigue, unspecified type        Relevant Orders    Comprehensive metabolic panel    Lyme Antibody Profile with reflex to WB    Screening for lipid disorders        Relevant Orders    Lipid Panel with Direct LDL reflex    Colon cancer screening        Relevant Orders    Ambulatory referral to General Surgery      I have spent 25 minutes with Patient  today in which greater than 50% of this time was spent in counseling/coordination of care regarding Diagnostic results, Prognosis, Risks and benefits of tx options, Intructions for management, Patient and family education, Importance of tx compliance, Risk factor reductions and Impressions  There are no Patient Instructions on file for this visit  Chief Complaint     Chief Complaint   Patient presents with    Menstrual Problem     Patient is here c/o irregular bleeding x's 3 days  Patient is 2 years post menopausal     Cough     Patient also c/o post nasal drip and cough x's 1 wk  History of Present Illness     HPI    59-year-old female here to discuss recent period  Patient states she got her period two weeks ago approximately 2 years after her period stopped  She states she started to feel soreness of the breasts, fatigue, low back discomfort, symptoms that she typically has felt prior to getting her period  Patient states her flow was regular the 1st day, the 2nd day was have a another day was better  tob use: less than a pack in a 24 period    Pt states she has had a 6 day h/o cough that is productive, PND, runny nose, nasal congestion, fatigue   took alleve  Has been exposed to sick contacts    Pt also states she noticed a small nontender lump over her rigtht lower cheek region  Does not know how long she has had this for  Review of Systems   Review of Systems   Constitutional: Negative for chills and fever  HENT: Positive for congestion, postnasal drip and rhinorrhea      Respiratory: Positive for cough  Negative for shortness of breath and wheezing  Gastrointestinal: Negative for abdominal pain and constipation  Genitourinary: Positive for menstrual problem  Negative for dysuria  Active Problem List     Patient Active Problem List   Diagnosis    Abnormal uterine bleeding    Tobacco use    Upper respiratory tract infection    Lump of skin    Hypovitaminosis D    Hearing loss    Routine health maintenance       Past Medical History:  Past Medical History:   Diagnosis Date    Hearing loss        Past Surgical History:  Past Surgical History:   Procedure Laterality Date    TONSILLECTOMY AND ADENOIDECTOMY         Family History:  Family History   Problem Relation Age of Onset    Diabetes Mother     Stroke Father         cerebral infarction    Breast cancer Maternal Aunt        Social History:  Social History     Social History    Marital status: /Civil Union     Spouse name: N/A    Number of children: N/A    Years of education: N/A     Occupational History    Not on file  Social History Main Topics    Smoking status: Current Every Day Smoker     Packs/day: 1 00     Years: 15 00    Smokeless tobacco: Never Used      Comment: quit for 15 yrs and now has smoked for 3 yrs  , per allscripts    Alcohol use Yes      Comment: social    Drug use: No    Sexual activity: Not on file     Other Topics Concern    Not on file     Social History Narrative    No narrative on file     I have reviewed the patient's medical history in detail; there are no changes to the history as noted in the electronic medical record  Objective     Vitals:    07/06/18 0932   BP: 112/74   Pulse: 76   Resp: 14   Temp: (!) 95 7 °F (35 4 °C)     Wt Readings from Last 3 Encounters:   07/06/18 90 4 kg (199 lb 6 4 oz)   10/17/17 94 1 kg (207 lb 8 oz)   08/09/17 91 2 kg (201 lb 2 1 oz)       Physical Exam   Constitutional: She is oriented to person, place, and time   She appears well-developed and well-nourished  HENT:   Head: Normocephalic and atraumatic  Mouth/Throat: Oropharynx is clear and moist      TMs intact and clear  nares with edema noted  Erythema and postnasal drainage noted in posterior oropharynx  Eyes: Conjunctivae and EOM are normal  Pupils are equal, round, and reactive to light  Neck: Normal range of motion  Neck supple  No thyromegaly present  Cardiovascular: Normal rate and regular rhythm  No murmur heard  Pulmonary/Chest: Effort normal and breath sounds normal    Abdominal: Soft  Bowel sounds are normal  She exhibits no distension  There is no tenderness  Musculoskeletal: Normal range of motion  Lymphadenopathy:     She has no cervical adenopathy  Neurological: She is alert and oriented to person, place, and time  Skin: No rash noted  Small, nontender subcutaneous lump over right lower cheek   Psychiatric: She has a normal mood and affect         Pertinent Laboratory/Diagnostic Studies:  Lab Results   Component Value Date    BUN 17 06/13/2017    CREATININE 0 65 06/13/2017    CALCIUM 9 4 06/13/2017     06/13/2017    K 4 3 06/13/2017    CO2 31 06/13/2017     06/13/2017     Lab Results   Component Value Date    ALT 14 06/13/2017    AST 18 06/13/2017    ALKPHOS 95 06/13/2017    BILITOT 0 6 06/13/2017       Lab Results   Component Value Date    WBC 6-10 (A) 10/17/2017    HGB 15 2 06/13/2017    HCT 45 8 (H) 06/13/2017    MCV 92 5 06/13/2017     06/13/2017       No results found for: TSH    Lab Results   Component Value Date    CHOL 195 06/13/2017     Lab Results   Component Value Date    TRIG 89 06/13/2017     Lab Results   Component Value Date    HDL 54 06/13/2017     No results found for: LDLCALC  No results found for: HGBA1C    Results for orders placed or performed in visit on 07/06/18   POCT urine dip   Result Value Ref Range    LEUKOCYTE ESTERASE,UA -      NITRITE,UA -     SL AMB POCT UROBILINOGEN 0 2     SL AMB POCT URINE PROTEIN 15+ PH,UA 6 0      BLOOD,UA -      SPECIFIC GRAVITY,UA 1 020      [facility], UA -      COLOR,UA Dark Yellow      CLARITY,UA Clear    POCT urine HCG   Result Value Ref Range     URINE HCG negative        Orders Placed This Encounter   Procedures    US pelvis complete non OB    US head neck soft tissue    CBC and differential    Comprehensive metabolic panel    Lipid Panel with Direct LDL reflex    TSH, 3rd generation with Free T4 reflex    Vitamin D 25 hydroxy    Lyme Antibody Profile with reflex to WB    UA (URINE) with reflex to Microscopic    Ambulatory referral to General Surgery    Ambulatory Referral to Otolaryngology    Ambulatory referral to Gynecology    POCT urine dip    POCT urine HCG       ALLERGIES:  No Known Allergies    Current Medications     Current Outpatient Prescriptions   Medication Sig Dispense Refill    Multiple Vitamin (MULTIVITAMINS PO) Take by mouth      azithromycin (ZITHROMAX) 250 mg tablet Take 2 tabs first day and 1 tab for days 2-5 6 tablet 0     No current facility-administered medications for this visit  Health Maintenance   There are no preventive care reminders to display for this patient    Immunization History   Administered Date(s) Administered    Tdap 09/23/2014       Cristina Alvarez MD

## 2018-07-11 LAB
25(OH)D3 SERPL-MCNC: 29 NG/ML (ref 30–100)
ALBUMIN SERPL-MCNC: 4.2 G/DL (ref 3.6–5.1)
ALBUMIN/GLOB SERPL: 1.8 (CALC) (ref 1–2.5)
ALP SERPL-CCNC: 99 U/L (ref 33–130)
ALT SERPL-CCNC: 16 U/L (ref 6–29)
AST SERPL-CCNC: 22 U/L (ref 10–35)
B BURGDOR AB SER IA-ACNC: <0.9 INDEX
BASOPHILS # BLD AUTO: 44 CELLS/UL (ref 0–200)
BASOPHILS NFR BLD AUTO: 0.6 %
BILIRUB SERPL-MCNC: 0.5 MG/DL (ref 0.2–1.2)
BUN SERPL-MCNC: 13 MG/DL (ref 7–25)
BUN/CREAT SERPL: NORMAL (CALC) (ref 6–22)
CALCIUM SERPL-MCNC: 9 MG/DL (ref 8.6–10.4)
CHLORIDE SERPL-SCNC: 105 MMOL/L (ref 98–110)
CHOLEST SERPL-MCNC: 178 MG/DL
CHOLEST/HDLC SERPL: 3.6 (CALC)
CO2 SERPL-SCNC: 28 MMOL/L (ref 20–31)
CREAT SERPL-MCNC: 0.71 MG/DL (ref 0.5–1.05)
EOSINOPHIL # BLD AUTO: 153 CELLS/UL (ref 15–500)
EOSINOPHIL NFR BLD AUTO: 2.1 %
ERYTHROCYTE [DISTWIDTH] IN BLOOD BY AUTOMATED COUNT: 12.4 % (ref 11–15)
GLOBULIN SER CALC-MCNC: 2.3 G/DL (CALC) (ref 1.9–3.7)
GLUCOSE SERPL-MCNC: 94 MG/DL (ref 65–99)
HCT VFR BLD AUTO: 45.4 % (ref 35–45)
HDLC SERPL-MCNC: 49 MG/DL
HGB BLD-MCNC: 15.3 G/DL (ref 11.7–15.5)
LDLC SERPL CALC-MCNC: 109 MG/DL (CALC)
LYMPHOCYTES # BLD AUTO: 1986 CELLS/UL (ref 850–3900)
LYMPHOCYTES NFR BLD AUTO: 27.2 %
MCH RBC QN AUTO: 30.9 PG (ref 27–33)
MCHC RBC AUTO-ENTMCNC: 33.7 G/DL (ref 32–36)
MCV RBC AUTO: 91.7 FL (ref 80–100)
MONOCYTES # BLD AUTO: 613 CELLS/UL (ref 200–950)
MONOCYTES NFR BLD AUTO: 8.4 %
NEUTROPHILS # BLD AUTO: 4504 CELLS/UL (ref 1500–7800)
NEUTROPHILS NFR BLD AUTO: 61.7 %
NONHDLC SERPL-MCNC: 129 MG/DL (CALC)
PLATELET # BLD AUTO: 258 THOUSAND/UL (ref 140–400)
PMV BLD REES-ECKER: 10.2 FL (ref 7.5–12.5)
POTASSIUM SERPL-SCNC: 4.8 MMOL/L (ref 3.5–5.3)
PROT SERPL-MCNC: 6.5 G/DL (ref 6.1–8.1)
RBC # BLD AUTO: 4.95 MILLION/UL (ref 3.8–5.1)
SL AMB EGFR AFRICAN AMERICAN: 113 ML/MIN/1.73M2
SL AMB EGFR NON AFRICAN AMERICAN: 98 ML/MIN/1.73M2
SODIUM SERPL-SCNC: 140 MMOL/L (ref 135–146)
TRIGL SERPL-MCNC: 100 MG/DL
TSH SERPL-ACNC: 1.96 MIU/L
WBC # BLD AUTO: 7.3 THOUSAND/UL (ref 3.8–10.8)

## 2018-07-13 ENCOUNTER — HOSPITAL ENCOUNTER (OUTPATIENT)
Dept: ULTRASOUND IMAGING | Facility: HOSPITAL | Age: 53
Discharge: HOME | End: 2018-07-13
Payer: COMMERCIAL

## 2018-07-13 ENCOUNTER — TELEPHONE (OUTPATIENT)
Dept: FAMILY MEDICINE CLINIC | Facility: CLINIC | Age: 53
End: 2018-07-13

## 2018-07-13 DIAGNOSIS — N93.9 ABNORMAL UTERINE BLEEDING: ICD-10-CM

## 2018-07-13 DIAGNOSIS — R22.9 LUMP OF SKIN: ICD-10-CM

## 2018-07-13 PROCEDURE — 76830 TRANSVAGINAL US NON-OB: CPT

## 2018-07-13 PROCEDURE — 76536 US EXAM OF HEAD AND NECK: CPT

## 2018-07-13 PROCEDURE — 76856 US EXAM PELVIC COMPLETE: CPT

## 2018-07-13 NOTE — TELEPHONE ENCOUNTER
----- Message from Sabina Cruz MD sent at 7/13/2018 12:21 PM EDT -----  Can you please let patient know that her total cholesterol is 178 and the ratio between the good and bad cholesterol was good  Her blood sugar, kidneys, liver, thyroid was within normal range  Her Lyme blood work was negative and her vitamin-D was decreased  I would like her to take 2000 international daily and double this in the winter months    Thank you

## 2018-07-13 NOTE — PROGRESS NOTES
Can you please let patient know that her total cholesterol is 178 and the ratio between the good and bad cholesterol was good  Her blood sugar, kidneys, liver, thyroid was within normal range  Her Lyme blood work was negative and her vitamin-D was decreased  I would like her to take 2000 international daily and double this in the winter months    Thank you

## 2018-07-19 NOTE — PROGRESS NOTES
Left a detailed message on patient's voicemail  Patient did leave a message when she return my call stating she wanted me to leave a message on her voicemail as she was working  Stated that her neck ultrasound showed a possible epidermoid inclusion cyst which is benign  I would like patient to keep an eye on the area  If it should increase in size, I would like her to let me know so I can send her to a specialist   In addition, also discussed on the voicemail message the results of her pelvic ultrasound which shows myometrial cysts, thickened endometrial lining and a left ovarian cyst   I would like patient to schedule appointment with gyn as endometrial sampling is recommended  I have also asked patient to the give me a call if she has any questions in the meantime

## 2018-07-27 ENCOUNTER — OFFICE VISIT (OUTPATIENT)
Dept: LAB | Facility: CLINIC | Age: 53
End: 2018-07-27
Payer: COMMERCIAL

## 2018-07-27 ENCOUNTER — TRANSCRIBE ORDERS (OUTPATIENT)
Dept: LAB | Facility: CLINIC | Age: 53
End: 2018-07-27

## 2018-07-27 DIAGNOSIS — H90.5 SENSORINEURAL HEARING LOSS (SNHL), UNSPECIFIED LATERALITY: Primary | ICD-10-CM

## 2018-07-27 DIAGNOSIS — H90.5 SENSORINEURAL HEARING LOSS (SNHL), UNSPECIFIED LATERALITY: ICD-10-CM

## 2018-07-27 LAB
ATRIAL RATE: 61 BPM
P AXIS: 24 DEGREES
PR INTERVAL: 154 MS
QRS AXIS: 26 DEGREES
QRSD INTERVAL: 90 MS
QT INTERVAL: 424 MS
QTC INTERVAL: 426 MS
T WAVE AXIS: 9 DEGREES
VENTRICULAR RATE: 61 BPM

## 2018-07-27 PROCEDURE — 93005 ELECTROCARDIOGRAM TRACING: CPT

## 2018-07-27 PROCEDURE — 93010 ELECTROCARDIOGRAM REPORT: CPT | Performed by: INTERNAL MEDICINE

## 2018-09-18 ENCOUNTER — OFFICE VISIT (OUTPATIENT)
Dept: OBGYN CLINIC | Facility: CLINIC | Age: 53
End: 2018-09-18
Payer: COMMERCIAL

## 2018-09-18 VITALS
WEIGHT: 201 LBS | HEIGHT: 70 IN | BODY MASS INDEX: 28.77 KG/M2 | SYSTOLIC BLOOD PRESSURE: 120 MMHG | DIASTOLIC BLOOD PRESSURE: 74 MMHG

## 2018-09-18 DIAGNOSIS — N95.0 POSTMENOPAUSAL BLEEDING: ICD-10-CM

## 2018-09-18 DIAGNOSIS — Z12.31 ENCOUNTER FOR SCREENING MAMMOGRAM FOR MALIGNANT NEOPLASM OF BREAST: ICD-10-CM

## 2018-09-18 DIAGNOSIS — Z01.419 ENCNTR FOR GYN EXAM (GENERAL) (ROUTINE) W/O ABN FINDINGS: Primary | ICD-10-CM

## 2018-09-18 PROBLEM — J06.9 UPPER RESPIRATORY TRACT INFECTION: Status: RESOLVED | Noted: 2018-07-06 | Resolved: 2018-09-18

## 2018-09-18 PROCEDURE — G0145 SCR C/V CYTO,THINLAYER,RESCR: HCPCS | Performed by: PHYSICIAN ASSISTANT

## 2018-09-18 PROCEDURE — 87624 HPV HI-RISK TYP POOLED RSLT: CPT | Performed by: PHYSICIAN ASSISTANT

## 2018-09-18 PROCEDURE — 58100 BIOPSY OF UTERUS LINING: CPT | Performed by: PHYSICIAN ASSISTANT

## 2018-09-18 PROCEDURE — 88305 TISSUE EXAM BY PATHOLOGIST: CPT | Performed by: PATHOLOGY

## 2018-09-18 PROCEDURE — 99386 PREV VISIT NEW AGE 40-64: CPT | Performed by: PHYSICIAN ASSISTANT

## 2018-09-18 NOTE — PROGRESS NOTES
Otilia Ladonna   1965    CC:  Yearly exam    S:  46 y o  female here as a NEW PATIENT for yearly exam and problem visit  She called the office for a visit for postmenopausal bleeding and was told she could have her yearly and we would address this at the same time  She is postmenopausal and had one day of vaginal bleeding reminiscent of a normal menstrual period  She has had no vaginal bleeding since  She denies vaginal discharge, itching, odor or dryness  She did see her PCP for this and they had ordered a pelvic ultrasound  This showed a 6mm endometrial stripe which is borderline thickened as well as a 5 5cm simple ovarian cyst on the left  Last Pap  9/23/14 neg/neg  Last Mammo unknown  Last Colonoscopy never      Current Outpatient Prescriptions:     Multiple Vitamin (MULTIVITAMINS PO), Take by mouth, Disp: , Rfl:   Social History     Social History    Marital status: /Civil Union     Spouse name: N/A    Number of children: N/A    Years of education: N/A     Occupational History    Not on file  Social History Main Topics    Smoking status: Current Every Day Smoker     Packs/day: 1 00     Years: 15 00    Smokeless tobacco: Never Used      Comment: quit for 15 yrs and now has smoked for 3 yrs  , per allscripts    Alcohol use Yes      Comment: social    Drug use: No    Sexual activity: Yes     Partners: Male     Other Topics Concern    Not on file     Social History Narrative    No narrative on file     Family History   Problem Relation Age of Onset    Diabetes Mother     Stroke Father         cerebral infarction    Breast cancer Maternal Aunt      Past Medical History:   Diagnosis Date    Abnormal Pap smear of cervix     Hearing loss     HPV (human papilloma virus) infection         O:  Blood pressure 120/74, height 5' 9 5" (1 765 m), weight 91 2 kg (201 lb)      Patient appears well and is not in distress  Neck is supple without masses  Breasts are symmetrical without mass, tenderness, nipple discharge, skin changes or adenopathy  Abdomen is soft and nontender without masses  External genitals are normal without lesions or rashes  Vagina is normal without discharge or bleeding  Cervix is normal without discharge or lesion  Uterus sounds to 6cm, scant tissue obtained  Uterus is normal, mobile, nontender without palpable mass  Adnexa - left is full and nontender    A:  Yearly exam  Postmenopausal bleed  P:   Pap and HPV today   Await endometrial biopsy pathology    Follow-up ultrasound in one year to document stability of    her ovarian cyst   Colonoscopy and mammogram strongly recommended   RTO one year for yearly exam or sooner as needed

## 2018-09-20 ENCOUNTER — TELEPHONE (OUTPATIENT)
Dept: OBGYN CLINIC | Facility: CLINIC | Age: 53
End: 2018-09-20

## 2018-09-20 LAB
HPV HR 12 DNA CVX QL NAA+PROBE: NEGATIVE
HPV16 DNA CVX QL NAA+PROBE: NEGATIVE
HPV18 DNA CVX QL NAA+PROBE: NEGATIVE

## 2018-09-20 NOTE — TELEPHONE ENCOUNTER
Vanesa Ask, MISSAEL  [address] [facility]             Please let Vianey Never know that her endometrial biopsy shows absolutely no abnormalities, just like we expected   She should still have her follow-up pelvic ultrasound in a year to check her ovarian cyst   She should call if she has any more bleeding

## 2018-09-20 NOTE — TELEPHONE ENCOUNTER
Left voicemail message today @ [phone] per Pt's signed communication consent form in EHR  Pt informed, via voicemail message, that her recent endometrial biopsy showed no abnormalities per Elizabeth Henderson' review  Pt advised, via vm message, that she should still have her follow-up pelvic ultrasound in a year to check her ovarian cyst per Elizabeth Henderson' recommendation  Reiterated to Pt, via vm message, that she should contact office if she has any more bleeding

## 2018-09-24 LAB
LAB AP GYN PRIMARY INTERPRETATION: NORMAL
Lab: NORMAL

## 2018-11-01 DIAGNOSIS — Z12.11 COLON CANCER SCREENING: Primary | ICD-10-CM

## 2020-04-30 ENCOUNTER — TELEMEDICINE (OUTPATIENT)
Dept: FAMILY MEDICINE CLINIC | Facility: CLINIC | Age: 55
End: 2020-04-30
Payer: COMMERCIAL

## 2020-04-30 VITALS
BODY MASS INDEX: 29.35 KG/M2 | HEIGHT: 70 IN | WEIGHT: 205 LBS | SYSTOLIC BLOOD PRESSURE: 114 MMHG | TEMPERATURE: 98.2 F | DIASTOLIC BLOOD PRESSURE: 73 MMHG | HEART RATE: 78 BPM

## 2020-04-30 DIAGNOSIS — Z20.828 EXPOSURE TO SARS-ASSOCIATED CORONAVIRUS: ICD-10-CM

## 2020-04-30 DIAGNOSIS — B34.9 VIRAL ILLNESS: Primary | ICD-10-CM

## 2020-04-30 DIAGNOSIS — E55.9 HYPOVITAMINOSIS D: ICD-10-CM

## 2020-04-30 DIAGNOSIS — R53.83 FATIGUE, UNSPECIFIED TYPE: ICD-10-CM

## 2020-04-30 DIAGNOSIS — Z13.6 ENCOUNTER FOR LIPID SCREENING FOR CARDIOVASCULAR DISEASE: ICD-10-CM

## 2020-04-30 DIAGNOSIS — Z13.220 ENCOUNTER FOR LIPID SCREENING FOR CARDIOVASCULAR DISEASE: ICD-10-CM

## 2020-04-30 PROCEDURE — U0003 INFECTIOUS AGENT DETECTION BY NUCLEIC ACID (DNA OR RNA); SEVERE ACUTE RESPIRATORY SYNDROME CORONAVIRUS 2 (SARS-COV-2) (CORONAVIRUS DISEASE [COVID-19]), AMPLIFIED PROBE TECHNIQUE, MAKING USE OF HIGH THROUGHPUT TECHNOLOGIES AS DESCRIBED BY CMS-2020-01-R: HCPCS

## 2020-04-30 PROCEDURE — 99214 OFFICE O/P EST MOD 30 MIN: CPT | Performed by: FAMILY MEDICINE

## 2020-05-01 ENCOUNTER — TELEPHONE (OUTPATIENT)
Dept: FAMILY MEDICINE CLINIC | Facility: CLINIC | Age: 55
End: 2020-05-01

## 2020-05-01 ENCOUNTER — TELEMEDICINE (OUTPATIENT)
Dept: FAMILY MEDICINE CLINIC | Facility: CLINIC | Age: 55
End: 2020-05-01
Payer: COMMERCIAL

## 2020-05-01 VITALS
HEIGHT: 70 IN | DIASTOLIC BLOOD PRESSURE: 63 MMHG | WEIGHT: 205 LBS | TEMPERATURE: 97.6 F | HEART RATE: 68 BPM | BODY MASS INDEX: 29.35 KG/M2 | SYSTOLIC BLOOD PRESSURE: 121 MMHG

## 2020-05-01 DIAGNOSIS — U07.1 COVID-19 VIRUS INFECTION: Primary | ICD-10-CM

## 2020-05-01 DIAGNOSIS — R07.89 CHEST TIGHTNESS: ICD-10-CM

## 2020-05-01 LAB — SARS-COV-2 RNA SPEC QL NAA+PROBE: DETECTED

## 2020-05-01 PROCEDURE — 99214 OFFICE O/P EST MOD 30 MIN: CPT | Performed by: FAMILY MEDICINE

## 2020-05-01 RX ORDER — ALBUTEROL SULFATE 90 UG/1
2 AEROSOL, METERED RESPIRATORY (INHALATION) EVERY 6 HOURS PRN
Qty: 18 G | Refills: 0 | Status: SHIPPED | OUTPATIENT
Start: 2020-05-01 | End: 2022-08-09 | Stop reason: ALTCHOICE

## 2020-05-02 ENCOUNTER — TELEMEDICINE (OUTPATIENT)
Dept: FAMILY MEDICINE CLINIC | Facility: CLINIC | Age: 55
End: 2020-05-02
Payer: COMMERCIAL

## 2020-05-02 VITALS — HEART RATE: 66 BPM | SYSTOLIC BLOOD PRESSURE: 133 MMHG | TEMPERATURE: 97.6 F | DIASTOLIC BLOOD PRESSURE: 60 MMHG

## 2020-05-02 DIAGNOSIS — U07.1 COVID-19 VIRUS INFECTION: Primary | ICD-10-CM

## 2020-05-02 PROCEDURE — 99214 OFFICE O/P EST MOD 30 MIN: CPT | Performed by: FAMILY MEDICINE

## 2020-05-03 ENCOUNTER — TELEMEDICINE (OUTPATIENT)
Dept: FAMILY MEDICINE CLINIC | Facility: CLINIC | Age: 55
End: 2020-05-03
Payer: COMMERCIAL

## 2020-05-03 ENCOUNTER — TELEPHONE (OUTPATIENT)
Dept: FAMILY MEDICINE CLINIC | Facility: CLINIC | Age: 55
End: 2020-05-03

## 2020-05-03 VITALS — TEMPERATURE: 97.2 F | SYSTOLIC BLOOD PRESSURE: 131 MMHG | HEART RATE: 66 BPM | DIASTOLIC BLOOD PRESSURE: 75 MMHG

## 2020-05-03 DIAGNOSIS — U07.1 COVID-19 VIRUS INFECTION: Primary | ICD-10-CM

## 2020-05-03 PROCEDURE — 99213 OFFICE O/P EST LOW 20 MIN: CPT | Performed by: FAMILY MEDICINE

## 2020-05-05 ENCOUNTER — TELEMEDICINE (OUTPATIENT)
Dept: FAMILY MEDICINE CLINIC | Facility: CLINIC | Age: 55
End: 2020-05-05
Payer: COMMERCIAL

## 2020-05-05 VITALS
BODY MASS INDEX: 29.35 KG/M2 | DIASTOLIC BLOOD PRESSURE: 72 MMHG | HEART RATE: 70 BPM | SYSTOLIC BLOOD PRESSURE: 122 MMHG | TEMPERATURE: 97.2 F | HEIGHT: 70 IN | WEIGHT: 205 LBS

## 2020-05-05 DIAGNOSIS — Z12.11 SCREENING FOR COLON CANCER: ICD-10-CM

## 2020-05-05 DIAGNOSIS — U07.1 COVID-19 VIRUS INFECTION: Primary | ICD-10-CM

## 2020-05-05 DIAGNOSIS — Z12.31 SCREENING MAMMOGRAM, ENCOUNTER FOR: ICD-10-CM

## 2020-05-05 PROCEDURE — 3008F BODY MASS INDEX DOCD: CPT | Performed by: FAMILY MEDICINE

## 2020-05-05 PROCEDURE — 99213 OFFICE O/P EST LOW 20 MIN: CPT | Performed by: FAMILY MEDICINE

## 2020-05-06 ENCOUNTER — TELEMEDICINE (OUTPATIENT)
Dept: FAMILY MEDICINE CLINIC | Facility: CLINIC | Age: 55
End: 2020-05-06
Payer: COMMERCIAL

## 2020-05-06 VITALS
TEMPERATURE: 97.7 F | HEART RATE: 73 BPM | WEIGHT: 200 LBS | DIASTOLIC BLOOD PRESSURE: 60 MMHG | BODY MASS INDEX: 28.7 KG/M2 | SYSTOLIC BLOOD PRESSURE: 119 MMHG

## 2020-05-06 DIAGNOSIS — U07.1 COVID-19 VIRUS INFECTION: Primary | ICD-10-CM

## 2020-05-06 PROCEDURE — 99213 OFFICE O/P EST LOW 20 MIN: CPT | Performed by: FAMILY MEDICINE

## 2020-05-07 ENCOUNTER — TELEMEDICINE (OUTPATIENT)
Dept: FAMILY MEDICINE CLINIC | Facility: CLINIC | Age: 55
End: 2020-05-07
Payer: COMMERCIAL

## 2020-05-07 VITALS — SYSTOLIC BLOOD PRESSURE: 122 MMHG | HEART RATE: 75 BPM | DIASTOLIC BLOOD PRESSURE: 70 MMHG | TEMPERATURE: 97.2 F

## 2020-05-07 DIAGNOSIS — U07.1 COVID-19 VIRUS INFECTION: Primary | ICD-10-CM

## 2020-05-07 PROCEDURE — 99214 OFFICE O/P EST MOD 30 MIN: CPT | Performed by: FAMILY MEDICINE

## 2020-05-08 ENCOUNTER — TELEMEDICINE (OUTPATIENT)
Dept: FAMILY MEDICINE CLINIC | Facility: CLINIC | Age: 55
End: 2020-05-08
Payer: COMMERCIAL

## 2020-05-08 VITALS — TEMPERATURE: 97.8 F | DIASTOLIC BLOOD PRESSURE: 67 MMHG | HEART RATE: 68 BPM | SYSTOLIC BLOOD PRESSURE: 124 MMHG

## 2020-05-08 DIAGNOSIS — U07.1 COVID-19 VIRUS INFECTION: Primary | ICD-10-CM

## 2020-05-08 PROCEDURE — 99213 OFFICE O/P EST LOW 20 MIN: CPT | Performed by: FAMILY MEDICINE

## 2020-05-27 ENCOUNTER — TELEPHONE (OUTPATIENT)
Dept: FAMILY MEDICINE CLINIC | Facility: CLINIC | Age: 55
End: 2020-05-27

## 2020-05-29 ENCOUNTER — TELEPHONE (OUTPATIENT)
Dept: FAMILY MEDICINE CLINIC | Facility: CLINIC | Age: 55
End: 2020-05-29

## 2020-12-21 ENCOUNTER — HOSPITAL ENCOUNTER (OUTPATIENT)
Dept: BONE DENSITY | Facility: IMAGING CENTER | Age: 55
Discharge: HOME | End: 2020-12-21
Payer: COMMERCIAL

## 2020-12-21 VITALS — HEIGHT: 70 IN | BODY MASS INDEX: 32.21 KG/M2 | WEIGHT: 225 LBS

## 2020-12-21 DIAGNOSIS — Z12.31 SCREENING MAMMOGRAM, ENCOUNTER FOR: ICD-10-CM

## 2020-12-21 PROCEDURE — 77067 SCR MAMMO BI INCL CAD: CPT

## 2020-12-21 PROCEDURE — 77063 BREAST TOMOSYNTHESIS BI: CPT

## 2022-07-21 ENCOUNTER — OFFICE VISIT (OUTPATIENT)
Dept: URGENT CARE | Facility: CLINIC | Age: 57
End: 2022-07-21
Payer: COMMERCIAL

## 2022-07-21 VITALS
SYSTOLIC BLOOD PRESSURE: 123 MMHG | HEART RATE: 69 BPM | HEIGHT: 70 IN | WEIGHT: 211 LBS | TEMPERATURE: 96.8 F | BODY MASS INDEX: 30.21 KG/M2 | DIASTOLIC BLOOD PRESSURE: 57 MMHG | OXYGEN SATURATION: 98 % | RESPIRATION RATE: 18 BRPM

## 2022-07-21 DIAGNOSIS — H66.91 RIGHT OTITIS MEDIA, UNSPECIFIED OTITIS MEDIA TYPE: Primary | ICD-10-CM

## 2022-07-21 DIAGNOSIS — H60.91 OTITIS EXTERNA OF RIGHT EAR, UNSPECIFIED CHRONICITY, UNSPECIFIED TYPE: ICD-10-CM

## 2022-07-21 DIAGNOSIS — T16.1XXA FOREIGN BODY OF RIGHT EAR, INITIAL ENCOUNTER: ICD-10-CM

## 2022-07-21 PROCEDURE — G0382 LEV 3 HOSP TYPE B ED VISIT: HCPCS | Performed by: PHYSICIAN ASSISTANT

## 2022-07-21 PROCEDURE — 69200 CLEAR OUTER EAR CANAL: CPT | Performed by: PHYSICIAN ASSISTANT

## 2022-07-21 RX ORDER — AMOXICILLIN 500 MG/1
500 CAPSULE ORAL EVERY 8 HOURS SCHEDULED
Qty: 30 CAPSULE | Refills: 0 | Status: SHIPPED | OUTPATIENT
Start: 2022-07-21 | End: 2022-07-31

## 2022-07-21 RX ORDER — NEOMYCIN SULFATE, POLYMYXIN B SULFATE AND HYDROCORTISONE 10; 3.5; 1 MG/ML; MG/ML; [USP'U]/ML
3 SUSPENSION/ DROPS AURICULAR (OTIC) EVERY 6 HOURS SCHEDULED
Qty: 10 ML | Refills: 0 | Status: SHIPPED | OUTPATIENT
Start: 2022-07-21 | End: 2022-08-09

## 2022-07-21 NOTE — PROGRESS NOTES
[id number]        NAME: Yun Brady is a 64 y o  female  : 1965    MRN: [id number]  DATE: 2022  TIME: 7:42 PM    /57   Pulse 69   Temp (!) 96 8 °F (36 °C)   Resp 18   Ht 5' 10" (1 778 m)   Wt 95 7 kg (211 lb)   SpO2 98%   BMI 30 28 kg/m²     Assessment and Plan   Right otitis media, unspecified otitis media type [H66 91]  1  Right otitis media, unspecified otitis media type  amoxicillin (AMOXIL) 500 mg capsule    neomycin-polymyxin-hydrocortisone (CORTISPORIN) 0 35%-10,000 units/mL-1% otic suspension   2  Otitis externa of right ear, unspecified chronicity, unspecified type  amoxicillin (AMOXIL) 500 mg capsule    neomycin-polymyxin-hydrocortisone (CORTISPORIN) 0 35%-10,000 units/mL-1% otic suspension   3  Foreign body of right ear, initial encounter  amoxicillin (AMOXIL) 500 mg capsule    neomycin-polymyxin-hydrocortisone (CORTISPORIN) 0 35%-10,000 units/mL-1% otic suspension         Patient Instructions       Follow up with PCP in 3-5 days  Proceed to  ER if symptoms worsen  Chief Complaint     Chief Complaint   Patient presents with    Earache     Right ear pain x 1 week         History of Present Illness       Pt with right ear pain x 1 week       Review of Systems   Review of Systems   Constitutional: Negative  HENT: Positive for ear pain  Eyes: Negative  Respiratory: Negative  Cardiovascular: Negative  Gastrointestinal: Negative  Endocrine: Negative  Genitourinary: Negative  Musculoskeletal: Negative  Skin: Negative  Allergic/Immunologic: Negative  Neurological: Negative  Hematological: Negative  Psychiatric/Behavioral: Negative  All other systems reviewed and are negative          Current Medications       Current Outpatient Medications:     amoxicillin (AMOXIL) 500 mg capsule, Take 1 capsule (500 mg total) by mouth every 8 (eight) hours for 10 days, Disp: 30 capsule, Rfl: 0    neomycin-polymyxin-hydrocortisone (CORTISPORIN) 0 35%-10,000 units/mL-1% otic suspension, Administer 3 drops to the right ear every 6 (six) hours for 10 days, Disp: 10 mL, Rfl: 0    albuterol (Ventolin HFA) 90 mcg/act inhaler, Inhale 2 puffs every 6 (six) hours as needed for wheezing (Chest tightness) (Patient not taking: Reported on 7/21/2022), Disp: 18 g, Rfl: 0    Multiple Vitamin (MULTIVITAMINS PO), Take by mouth (Patient not taking: Reported on 7/21/2022), Disp: , Rfl:     Current Allergies     Allergies as of 07/21/2022    (No Known Allergies)            The following portions of the patient's history were reviewed and updated as appropriate: allergies, current medications, past family history, past medical history, past social history, past surgical history and problem list      Past Medical History:   Diagnosis Date    Abnormal Pap smear of cervix     Hearing loss     HPV (human papilloma virus) infection        Past Surgical History:   Procedure Laterality Date    TONSILLECTOMY AND ADENOIDECTOMY         Family History   Problem Relation Age of Onset    Diabetes Mother     Breast cancer Mother 80    Stroke Father         cerebral infarction    No Known Problems Maternal Aunt     No Known Problems Sister     No Known Problems Daughter     No Known Problems Maternal Grandmother     No Known Problems Maternal Grandfather     No Known Problems Paternal Grandmother     No Known Problems Paternal Grandfather     No Known Problems Paternal Aunt     No Known Problems Paternal Aunt     No Known Problems Maternal Aunt     No Known Problems Maternal Aunt          Medications have been verified  Objective   /57   Pulse 69   Temp (!) 96 8 °F (36 °C)   Resp 18   Ht 5' 10" (1 778 m)   Wt 95 7 kg (211 lb)   SpO2 98%   BMI 30 28 kg/m²        Physical Exam     Physical Exam  Vitals and nursing note reviewed  Constitutional:       Appearance: Normal appearance  She is well-developed and normal weight     HENT: Head: Normocephalic and atraumatic  Right Ear: External ear normal       Left Ear: Tympanic membrane, ear canal and external ear normal       Ears:      Comments: Plastic tip of hearing aid in right ear canal  Easily removed with forceps, post removal tm erythema and ear canal swelling and erythema  No perforation      Nose: Nose normal       Mouth/Throat:      Mouth: Mucous membranes are moist       Pharynx: Oropharynx is clear  Eyes:      Extraocular Movements: Extraocular movements intact  Conjunctiva/sclera: Conjunctivae normal       Pupils: Pupils are equal, round, and reactive to light  Cardiovascular:      Rate and Rhythm: Normal rate and regular rhythm  Pulses: Normal pulses  Heart sounds: Normal heart sounds  Pulmonary:      Effort: Pulmonary effort is normal       Breath sounds: Normal breath sounds  Abdominal:      General: Abdomen is flat  Bowel sounds are normal       Palpations: Abdomen is soft  Musculoskeletal:         General: Normal range of motion  Cervical back: Normal range of motion and neck supple  Skin:     General: Skin is warm  Capillary Refill: Capillary refill takes less than 2 seconds  Neurological:      General: No focal deficit present  Mental Status: She is alert and oriented to person, place, and time  Psychiatric:         Mood and Affect: Mood normal          Behavior: Behavior normal          Thought Content:  Thought content normal          Judgment: Judgment normal

## 2022-08-09 ENCOUNTER — OFFICE VISIT (OUTPATIENT)
Dept: FAMILY MEDICINE CLINIC | Facility: CLINIC | Age: 57
End: 2022-08-09
Payer: COMMERCIAL

## 2022-08-09 VITALS
TEMPERATURE: 98 F | HEART RATE: 73 BPM | OXYGEN SATURATION: 98 % | DIASTOLIC BLOOD PRESSURE: 78 MMHG | RESPIRATION RATE: 16 BRPM | SYSTOLIC BLOOD PRESSURE: 124 MMHG | BODY MASS INDEX: 30.61 KG/M2 | HEIGHT: 70 IN | WEIGHT: 213.8 LBS

## 2022-08-09 DIAGNOSIS — E55.9 HYPOVITAMINOSIS D: ICD-10-CM

## 2022-08-09 DIAGNOSIS — Z12.31 BREAST CANCER SCREENING BY MAMMOGRAM: ICD-10-CM

## 2022-08-09 DIAGNOSIS — Z12.11 COLON CANCER SCREENING: ICD-10-CM

## 2022-08-09 DIAGNOSIS — E66.09 CLASS 1 OBESITY DUE TO EXCESS CALORIES WITHOUT SERIOUS COMORBIDITY WITH BODY MASS INDEX (BMI) OF 30.0 TO 30.9 IN ADULT: ICD-10-CM

## 2022-08-09 DIAGNOSIS — Z13.220 LIPID SCREENING: ICD-10-CM

## 2022-08-09 DIAGNOSIS — Z23 NEED FOR PNEUMOCOCCAL VACCINATION: ICD-10-CM

## 2022-08-09 DIAGNOSIS — Z00.00 PHYSICAL EXAM, ANNUAL: Primary | ICD-10-CM

## 2022-08-09 PROCEDURE — 90471 IMMUNIZATION ADMIN: CPT

## 2022-08-09 PROCEDURE — 99396 PREV VISIT EST AGE 40-64: CPT | Performed by: NURSE PRACTITIONER

## 2022-08-09 PROCEDURE — 90677 PCV20 VACCINE IM: CPT

## 2022-08-09 PROCEDURE — 3725F SCREEN DEPRESSION PERFORMED: CPT | Performed by: NURSE PRACTITIONER

## 2022-08-09 NOTE — PROGRESS NOTES
FAMILY PRACTICE HEALTH MAINTENANCE OFFICE VISIT  [facility]    NAME: Otilia Dougherty  AGE: 64 y o  SEX: female  : 1965     DATE: 2022    Assessment and Plan     1  Physical exam, annual  -     CBC; Future  -     Comprehensive metabolic panel; Future    2  Colon cancer screening  -     Cologuard    3  Breast cancer screening by mammogram  -     Mammo screening bilateral w 3d & cad; Future; Expected date: 2022    4  Lipid screening  -     Lipid panel; Future    5  Class 1 obesity due to excess calories without serious comorbidity with body mass index (BMI) of 30 0 to 30 9 in adult  -     CBC; Future  -     Comprehensive metabolic panel; Future  -     Lipid panel; Future    6  Hypovitaminosis D  -     Vitamin D 25 hydroxy; Future    7  Need for pneumococcal vaccination  -     Pneumococcal Conjugate Vaccine 20-valent (Pcv20)      Patient Counseling:   Nutrition: Stressed importance of a well balanced diet, moderation of sodium/saturated fat, caloric balance and sufficient intake of fiber  Exercise: Stressed the importance of regular exercise with a goal of 150 minutes per week  Dental Health: Discussed daily flossing and brushing and regular dental visits     Immunizations reviewed: Up To Date Had COVID-19 primary series with 1st booster  Discussed benefits of:  Colon Cancer Screening, Mammogram , Cervical Cancer screening and Screening labs  BMI Counseling: Body mass index is 30 68 kg/m²  Discussed with patient's BMI with her  The BMI is above normal  Nutrition recommendations include 3-5 servings of fruits/vegetables daily  Exercise recommendations include exercising 3-5 times per week  Tobacco Cessation Counseling: Tobacco cessation counseling and education was provided  The patient is sincerely urged to quit consumption of tobacco  She is not ready to quit tobacco  The numerous health risks of tobacco consumption were discussed   If she decides to quit, there are a number of helpful adjunctive aids, and she can see me to discuss nicotine replacement therapy, chantix, or bupropion anytime in the future  Follow up in one year for annual physical or sooner if needed  Reluctant to do health care screenings due to cost    Declines CT lung cancer screening program     Chief Complaint     Chief Complaint   Patient presents with    Physical Exam     Annual well exam       History of Present Illness     Ruy Gomez is a 64year old female presenting today for annual exam     CNA working night shift  [employer] [facility] [facility] [employer]  Able to sleep daytime  Is a good fit for her  2 children live locally      Smoking-- not ready to quit  Has quit off an on for years  18 started  <1PPD  CT lung cancer screening: Does not desire to complete  Hearing aids  Colonoscopy declines  Will do cologuard  GYN: [facility] she is due to schedule  Last exam 2018  Angular cheilitis: she feels is from toothpaste, uses chap stick-- helps  Prevnar 20 will do  Caltrate in the past has not tolerated  Well Adult Physical   Patient here for a comprehensive physical exam       Diet and Physical Activity  Diet: well balanced diet  Exercise: not as much as she would like too, hard to find time to exercise         Depression Screen  PHQ-2/9 Depression Screening    Little interest or pleasure in doing things: 0 - not at all  Feeling down, depressed, or hopeless: 0 - not at all  PHQ-2 Score: 0  PHQ-2 Interpretation: Negative depression screen          General Health  Hearing: Normal:  bilateral  Vision: no vision problems  Dental: regular dental visits    Reproductive Health  Post-menopausal       The following portions of the patient's history were reviewed and updated as appropriate: allergies, current medications, past family history, past medical history, past social history, past surgical history and problem list     Review of Systems Review of Systems   Constitutional: Negative  HENT: Negative  Eyes: Negative for visual disturbance  Respiratory: Negative  Cardiovascular: Negative  Gastrointestinal: Negative  Genitourinary: Negative  Musculoskeletal: Negative  Skin: Negative  Neurological: Negative  Hematological: Negative  Psychiatric/Behavioral: Negative          Past Medical History     Past Medical History:   Diagnosis Date    Abnormal Pap smear of cervix     Hearing loss     HPV (human papilloma virus) infection        Past Surgical History     Past Surgical History:   Procedure Laterality Date    TONSILLECTOMY AND ADENOIDECTOMY         Social History     Social History     Socioeconomic History    Marital status: /Civil Union     Spouse name: None    Number of children: None    Years of education: None    Highest education level: None   Occupational History    None   Tobacco Use    Smoking status: Current Every Day Smoker     Packs/day: 1 00     Years: 20 00     Pack years: 20 00     Types: Cigarettes    Smokeless tobacco: Never Used   Vaping Use    Vaping Use: Never used   Substance and Sexual Activity    Alcohol use: Yes     Comment: social    Drug use: No    Sexual activity: Yes     Partners: Male   Other Topics Concern    None   Social History Narrative    None     Social Determinants of Health     Financial Resource Strain: Not on file   Food Insecurity: Not on file   Transportation Needs: Not on file   Physical Activity: Not on file   Stress: Not on file   Social Connections: Not on file   Intimate Partner Violence: Not on file   Housing Stability: Not on file       Family History     Family History   Problem Relation Age of Onset    Diabetes Mother     Breast cancer Mother 80    Stroke Father         cerebral infarction    No Known Problems Maternal Aunt     No Known Problems Sister     No Known Problems Daughter     No Known Problems Maternal Grandmother     No Known Problems Maternal Grandfather     No Known Problems Paternal Grandmother     No Known Problems Paternal Grandfather     No Known Problems Paternal Aunt     No Known Problems Paternal Aunt     No Known Problems Maternal Aunt     No Known Problems Maternal Aunt        Current Medications     No current outpatient medications on file  Allergies     No Known Allergies    Objective     /78 (BP Location: Right arm, Patient Position: Sitting, Cuff Size: Large)   Pulse 73   Temp 98 °F (36 7 °C)   Resp 16   Ht 5' 10" (1 778 m)   Wt 97 kg (213 lb 12 8 oz)   SpO2 98%   BMI 30 68 kg/m²      Physical Exam  Vitals and nursing note reviewed  Constitutional:       General: She is not in acute distress  Appearance: Normal appearance  She is well-developed  She is not ill-appearing  HENT:      Head: Normocephalic and atraumatic  Right Ear: Tympanic membrane normal       Left Ear: Tympanic membrane normal       Mouth/Throat:      Mouth: Mucous membranes are moist       Pharynx: Oropharynx is clear  Eyes:      Conjunctiva/sclera: Conjunctivae normal       Pupils: Pupils are equal, round, and reactive to light  Neck:      Thyroid: No thyromegaly  Cardiovascular:      Rate and Rhythm: Normal rate and regular rhythm  Heart sounds: No murmur heard  Pulmonary:      Effort: Pulmonary effort is normal       Breath sounds: Normal breath sounds  Abdominal:      General: Bowel sounds are normal       Palpations: Abdomen is soft  Tenderness: There is no abdominal tenderness  Musculoskeletal:      Cervical back: Normal range of motion and neck supple  Right lower leg: No edema  Left lower leg: No edema  Lymphadenopathy:      Cervical: No cervical adenopathy  Skin:     Findings: No rash  Comments: Small mildly scaling fissures at corners of mouth, no redness  Neurological:      Mental Status: She is alert and oriented to person, place, and time     Psychiatric: Mood and Affect: Mood normal                MICHELLE Holliday  [address] [facility]

## 2022-08-19 LAB
25(OH)D3 SERPL-MCNC: 21 NG/ML (ref 30–100)
ALBUMIN SERPL-MCNC: 4.6 G/DL (ref 3.6–5.1)
ALBUMIN/GLOB SERPL: 1.6 (CALC) (ref 1–2.5)
ALP SERPL-CCNC: 104 U/L (ref 37–153)
ALT SERPL-CCNC: 20 U/L (ref 6–29)
AST SERPL-CCNC: 20 U/L (ref 10–35)
BASOPHILS # BLD AUTO: 67 CELLS/UL (ref 0–200)
BASOPHILS NFR BLD AUTO: 0.8 %
BILIRUB SERPL-MCNC: 0.5 MG/DL (ref 0.2–1.2)
BUN SERPL-MCNC: 15 MG/DL (ref 7–25)
BUN/CREAT SERPL: NORMAL (CALC) (ref 6–22)
CALCIUM SERPL-MCNC: 9.6 MG/DL (ref 8.6–10.4)
CHLORIDE SERPL-SCNC: 103 MMOL/L (ref 98–110)
CHOLEST SERPL-MCNC: 254 MG/DL
CHOLEST/HDLC SERPL: 5 (CALC)
CO2 SERPL-SCNC: 29 MMOL/L (ref 20–32)
CREAT SERPL-MCNC: 0.76 MG/DL (ref 0.5–1.03)
EOSINOPHIL # BLD AUTO: 143 CELLS/UL (ref 15–500)
EOSINOPHIL NFR BLD AUTO: 1.7 %
ERYTHROCYTE [DISTWIDTH] IN BLOOD BY AUTOMATED COUNT: 12.7 % (ref 11–15)
GFR/BSA.PRED SERPLBLD CYS-BASED-ARV: 92 ML/MIN/1.73M2
GLOBULIN SER CALC-MCNC: 2.9 G/DL (CALC) (ref 1.9–3.7)
GLUCOSE SERPL-MCNC: 92 MG/DL (ref 65–99)
HCT VFR BLD AUTO: 46.2 % (ref 35–45)
HDLC SERPL-MCNC: 51 MG/DL
HGB BLD-MCNC: 15.8 G/DL (ref 11.7–15.5)
LDLC SERPL CALC-MCNC: 177 MG/DL (CALC)
LYMPHOCYTES # BLD AUTO: 2268 CELLS/UL (ref 850–3900)
LYMPHOCYTES NFR BLD AUTO: 27 %
MCH RBC QN AUTO: 30.9 PG (ref 27–33)
MCHC RBC AUTO-ENTMCNC: 34.2 G/DL (ref 32–36)
MCV RBC AUTO: 90.2 FL (ref 80–100)
MONOCYTES # BLD AUTO: 588 CELLS/UL (ref 200–950)
MONOCYTES NFR BLD AUTO: 7 %
NEUTROPHILS # BLD AUTO: 5334 CELLS/UL (ref 1500–7800)
NEUTROPHILS NFR BLD AUTO: 63.5 %
NONHDLC SERPL-MCNC: 203 MG/DL (CALC)
PLATELET # BLD AUTO: 264 THOUSAND/UL (ref 140–400)
PMV BLD REES-ECKER: 10 FL (ref 7.5–12.5)
POTASSIUM SERPL-SCNC: 4.5 MMOL/L (ref 3.5–5.3)
PROT SERPL-MCNC: 7.5 G/DL (ref 6.1–8.1)
RBC # BLD AUTO: 5.12 MILLION/UL (ref 3.8–5.1)
SERVICE CMNT-IMP: ABNORMAL
SODIUM SERPL-SCNC: 139 MMOL/L (ref 135–146)
TRIGL SERPL-MCNC: 128 MG/DL
WBC # BLD AUTO: 8.4 THOUSAND/UL (ref 3.8–10.8)

## 2022-08-23 DIAGNOSIS — D58.2 ELEVATED HEMOGLOBIN (HCC): ICD-10-CM

## 2022-08-23 DIAGNOSIS — E55.9 HYPOVITAMINOSIS D: Primary | ICD-10-CM

## 2022-08-23 DIAGNOSIS — E78.5 HYPERLIPIDEMIA, UNSPECIFIED HYPERLIPIDEMIA TYPE: ICD-10-CM

## 2022-08-24 LAB — COLOGUARD RESULT REPORTABLE: NEGATIVE

## 2022-08-25 ENCOUNTER — TELEPHONE (OUTPATIENT)
Dept: FAMILY MEDICINE CLINIC | Facility: CLINIC | Age: 57
End: 2022-08-25

## 2022-08-25 NOTE — TELEPHONE ENCOUNTER
----- Message from [id number] sent at 8/25/2022 12:35 PM EDT -----  Cologuard is negative  Please repeat in 3 years

## 2024-02-21 PROBLEM — Z00.00 ROUTINE HEALTH MAINTENANCE: Status: RESOLVED | Noted: 2018-07-06 | Resolved: 2024-02-21

## 2024-08-15 ENCOUNTER — TELEPHONE (OUTPATIENT)
Dept: FAMILY MEDICINE CLINIC | Facility: CLINIC | Age: 59
End: 2024-08-15

## 2024-08-18 NOTE — TELEPHONE ENCOUNTER
08/17/24 11:12 PM        The office's request has been received, reviewed, and the patient chart updated. The PCP has successfully been removed with a patient attribution note. This message will now be completed.        Thank you  Napoleon Velazquez